# Patient Record
Sex: FEMALE | Race: OTHER | HISPANIC OR LATINO | Employment: FULL TIME | URBAN - METROPOLITAN AREA
[De-identification: names, ages, dates, MRNs, and addresses within clinical notes are randomized per-mention and may not be internally consistent; named-entity substitution may affect disease eponyms.]

---

## 2018-03-30 ENCOUNTER — HOSPITAL ENCOUNTER (EMERGENCY)
Facility: HOSPITAL | Age: 33
Discharge: HOME | End: 2018-03-30
Admitting: EMERGENCY MEDICINE
Payer: COMMERCIAL

## 2018-03-30 ENCOUNTER — APPOINTMENT (EMERGENCY)
Dept: ULTRASOUND IMAGING | Facility: HOSPITAL | Age: 33
End: 2018-03-30
Payer: COMMERCIAL

## 2018-03-30 VITALS
OXYGEN SATURATION: 95 % | HEART RATE: 64 BPM | SYSTOLIC BLOOD PRESSURE: 110 MMHG | TEMPERATURE: 98.6 F | WEIGHT: 162 LBS | RESPIRATION RATE: 18 BRPM | DIASTOLIC BLOOD PRESSURE: 62 MMHG

## 2018-03-30 DIAGNOSIS — R10.2 PELVIC PAIN: ICD-10-CM

## 2018-03-30 DIAGNOSIS — O03.9 FETAL DEMISE DUE TO MISCARRIAGE: Primary | ICD-10-CM

## 2018-03-30 LAB
ABO GROUP BLD: NORMAL
ALBUMIN SERPL BCP-MCNC: 4.1 G/DL (ref 3.5–5)
ALP SERPL-CCNC: 41 U/L (ref 46–116)
ALT SERPL W P-5'-P-CCNC: 15 U/L (ref 12–78)
ANION GAP SERPL CALCULATED.3IONS-SCNC: 8 MMOL/L (ref 4–13)
AST SERPL W P-5'-P-CCNC: 14 U/L (ref 5–45)
B-HCG SERPL-ACNC: ABNORMAL MIU/ML
BACTERIA UR QL AUTO: ABNORMAL /HPF
BASOPHILS # BLD AUTO: 0.03 THOUSANDS/ΜL (ref 0–0.1)
BASOPHILS NFR BLD AUTO: 0 % (ref 0–1)
BILIRUB SERPL-MCNC: 0.28 MG/DL (ref 0.2–1)
BILIRUB UR QL STRIP: NEGATIVE
BUN SERPL-MCNC: 8 MG/DL (ref 5–25)
CALCIUM SERPL-MCNC: 9 MG/DL (ref 8.3–10.1)
CHLORIDE SERPL-SCNC: 101 MMOL/L (ref 100–108)
CLARITY UR: CLEAR
CO2 SERPL-SCNC: 26 MMOL/L (ref 21–32)
COLOR UR: YELLOW
CREAT SERPL-MCNC: 0.73 MG/DL (ref 0.6–1.3)
EOSINOPHIL # BLD AUTO: 0.17 THOUSAND/ΜL (ref 0–0.61)
EOSINOPHIL NFR BLD AUTO: 2 % (ref 0–6)
ERYTHROCYTE [DISTWIDTH] IN BLOOD BY AUTOMATED COUNT: 13.3 % (ref 11.6–15.1)
EXT PREG TEST URINE: POSITIVE
GFR SERPL CREATININE-BSD FRML MDRD: 111 ML/MIN/1.73SQ M
GLUCOSE SERPL-MCNC: 84 MG/DL (ref 65–140)
GLUCOSE UR STRIP-MCNC: NEGATIVE MG/DL
HCT VFR BLD AUTO: 36.2 % (ref 34.8–46.1)
HGB BLD-MCNC: 12.2 G/DL (ref 11.5–15.4)
HGB UR QL STRIP.AUTO: NEGATIVE
KETONES UR STRIP-MCNC: NEGATIVE MG/DL
LEUKOCYTE ESTERASE UR QL STRIP: ABNORMAL
LIPASE SERPL-CCNC: 128 U/L (ref 73–393)
LYMPHOCYTES # BLD AUTO: 1.95 THOUSANDS/ΜL (ref 0.6–4.47)
LYMPHOCYTES NFR BLD AUTO: 23 % (ref 14–44)
MCH RBC QN AUTO: 29 PG (ref 26.8–34.3)
MCHC RBC AUTO-ENTMCNC: 33.7 G/DL (ref 31.4–37.4)
MCV RBC AUTO: 86 FL (ref 82–98)
MONOCYTES # BLD AUTO: 0.83 THOUSAND/ΜL (ref 0.17–1.22)
MONOCYTES NFR BLD AUTO: 10 % (ref 4–12)
NEUTROPHILS # BLD AUTO: 5.39 THOUSANDS/ΜL (ref 1.85–7.62)
NEUTS SEG NFR BLD AUTO: 65 % (ref 43–75)
NITRITE UR QL STRIP: NEGATIVE
NON-SQ EPI CELLS URNS QL MICRO: ABNORMAL /HPF
NRBC BLD AUTO-RTO: 0 /100 WBCS
PH UR STRIP.AUTO: 7.5 [PH] (ref 4.5–8)
PLATELET # BLD AUTO: 218 THOUSANDS/UL (ref 149–390)
PMV BLD AUTO: 12.5 FL (ref 8.9–12.7)
POTASSIUM SERPL-SCNC: 3.9 MMOL/L (ref 3.5–5.3)
PROT SERPL-MCNC: 7.6 G/DL (ref 6.4–8.2)
PROT UR STRIP-MCNC: NEGATIVE MG/DL
RBC # BLD AUTO: 4.21 MILLION/UL (ref 3.81–5.12)
RBC #/AREA URNS AUTO: ABNORMAL /HPF
RH BLD: POSITIVE
SODIUM SERPL-SCNC: 135 MMOL/L (ref 136–145)
SP GR UR STRIP.AUTO: 1.01 (ref 1–1.03)
UROBILINOGEN UR QL STRIP.AUTO: 0.2 E.U./DL
WBC # BLD AUTO: 8.37 THOUSAND/UL (ref 4.31–10.16)
WBC #/AREA URNS AUTO: ABNORMAL /HPF

## 2018-03-30 PROCEDURE — 84702 CHORIONIC GONADOTROPIN TEST: CPT | Performed by: PHYSICIAN ASSISTANT

## 2018-03-30 PROCEDURE — 85025 COMPLETE CBC W/AUTO DIFF WBC: CPT | Performed by: PHYSICIAN ASSISTANT

## 2018-03-30 PROCEDURE — 81025 URINE PREGNANCY TEST: CPT | Performed by: PHYSICIAN ASSISTANT

## 2018-03-30 PROCEDURE — 36415 COLL VENOUS BLD VENIPUNCTURE: CPT | Performed by: PHYSICIAN ASSISTANT

## 2018-03-30 PROCEDURE — 80053 COMPREHEN METABOLIC PANEL: CPT | Performed by: PHYSICIAN ASSISTANT

## 2018-03-30 PROCEDURE — 99284 EMERGENCY DEPT VISIT MOD MDM: CPT

## 2018-03-30 PROCEDURE — 76801 OB US < 14 WKS SINGLE FETUS: CPT

## 2018-03-30 PROCEDURE — 96361 HYDRATE IV INFUSION ADD-ON: CPT

## 2018-03-30 PROCEDURE — 81001 URINALYSIS AUTO W/SCOPE: CPT

## 2018-03-30 PROCEDURE — 86901 BLOOD TYPING SEROLOGIC RH(D): CPT | Performed by: PHYSICIAN ASSISTANT

## 2018-03-30 PROCEDURE — 96360 HYDRATION IV INFUSION INIT: CPT

## 2018-03-30 PROCEDURE — 86900 BLOOD TYPING SEROLOGIC ABO: CPT | Performed by: PHYSICIAN ASSISTANT

## 2018-03-30 PROCEDURE — 83690 ASSAY OF LIPASE: CPT | Performed by: PHYSICIAN ASSISTANT

## 2018-03-30 RX ORDER — IBUPROFEN 600 MG/1
600 TABLET ORAL EVERY 6 HOURS PRN
Qty: 30 TABLET | Refills: 0 | Status: SHIPPED | OUTPATIENT
Start: 2018-03-30

## 2018-03-30 RX ADMIN — SODIUM CHLORIDE 1000 ML: 0.9 INJECTION, SOLUTION INTRAVENOUS at 13:04

## 2018-03-30 NOTE — ED PROVIDER NOTES
History  Chief Complaint   Patient presents with    Vaginal Pain     Pelvic and vaginal pain that started forty minutes prior to ED arrival       32y  o female with no significant PMH presents to the ER for lower abdominal and vaginal pain for 1 day  Patient is [id number]  Patient believes she is about 6 weeks pregnant  Her LMP was 2/2  She was seeing [facility] and had an ultrasound completed  Patient states they did not see a heartbeat and told her that the baby was   Patient states she was sent home from [facility] and encouraged to follow up for another appointment at a later date  Patient comes to the ER for a second opinion  Patient denies taking any medication for pain  She describes her pain as crampy and non-radiating  She rates her pain 10/10 and states it comes and goes  Associated symptoms: nausea and subjective fever  She denies chills, chest pain, dyspnea, vomiting, diarrhea, vaginal bleeding, vaginal discharge, urinary symptoms, weakness or paresthesias  History provided by:  Patient   used: No        None       History reviewed  No pertinent past medical history  History reviewed  No pertinent surgical history  History reviewed  No pertinent family history  I have reviewed and agree with the history as documented  Social History   Substance Use Topics    Smoking status: Current Every Day Smoker    Smokeless tobacco: Never Used    Alcohol use No        Review of Systems   Constitutional: Positive for fever (subjective)  Negative for chills  Eyes: Negative for redness  Respiratory: Negative for shortness of breath  Cardiovascular: Negative for chest pain  Gastrointestinal: Positive for abdominal pain and nausea  Negative for diarrhea and vomiting  Genitourinary: Positive for vaginal pain  Negative for dysuria, frequency, hematuria, urgency, vaginal bleeding and vaginal discharge  Musculoskeletal: Negative for neck stiffness     Skin: Negative for rash  Allergic/Immunologic: Negative for food allergies  Neurological: Negative for weakness and numbness  Physical Exam  ED Triage Vitals   Temperature Pulse Respirations Blood Pressure SpO2   03/30/18 1201 03/30/18 1201 03/30/18 1201 03/30/18 1201 03/30/18 1201   98 6 °F (37 °C) 77 18 134/72 100 %      Temp Source Heart Rate Source Patient Position - Orthostatic VS BP Location FiO2 (%)   03/30/18 1201 03/30/18 1510 03/30/18 1510 03/30/18 1510 --   Oral Monitor Lying Right arm       Pain Score       03/30/18 1201       Worst Possible Pain           Orthostatic Vital Signs  Vitals:    03/30/18 1201 03/30/18 1510   BP: 134/72 110/62   Pulse: 77 64   Patient Position - Orthostatic VS:  Lying       Physical Exam   Constitutional:  Non-toxic appearance  No distress  HENT:   Head: Normocephalic and atraumatic  Right Ear: Tympanic membrane, external ear and ear canal normal  No drainage, swelling or tenderness  No foreign bodies  Tympanic membrane is not erythematous  No hemotympanum  Left Ear: Tympanic membrane, external ear and ear canal normal  No drainage, swelling or tenderness  No foreign bodies  Tympanic membrane is not erythematous  No hemotympanum  Nose: Nose normal    Mouth/Throat: Uvula is midline, oropharynx is clear and moist and mucous membranes are normal  No uvula swelling  No posterior oropharyngeal edema, posterior oropharyngeal erythema or tonsillar abscesses  No tonsillar exudate  Neck: Normal range of motion and phonation normal  Neck supple  No tracheal deviation present  Cardiovascular: Normal rate, regular rhythm, S1 normal, S2 normal and normal heart sounds  Exam reveals no gallop and no friction rub  No murmur heard  Pulmonary/Chest: Effort normal and breath sounds normal  No respiratory distress  She has no decreased breath sounds  She has no wheezes  She has no rhonchi  She has no rales  She exhibits no tenderness  Abdominal: Soft   Bowel sounds are normal  She exhibits no distension  There is tenderness (significant amount of pain in right pelvic area ) in the right lower quadrant, suprapubic area and left lower quadrant  There is no rebound, no guarding and no CVA tenderness  Neurological: She is alert  GCS eye subscore is 4  GCS verbal subscore is 5  GCS motor subscore is 6  Skin: Skin is warm and dry  No rash noted  Psychiatric: She has a normal mood and affect  Nursing note and vitals reviewed        ED Medications  Medications   sodium chloride 0 9 % bolus 1,000 mL (1,000 mL Intravenous New Bag 3/30/18 1304)       Diagnostic Studies  Results Reviewed     Procedure Component Value Units Date/Time    Lipase [[id number]]  (Normal) Collected:  03/30/18 1300    Lab Status:  Final result Specimen:  Blood from Arm, Left Updated:  03/30/18 1350     Lipase 128 u/L     Pregnancy, hCG, quantitative [[id number]]  (Abnormal) Collected:  03/30/18 1300    Lab Status:  Final result Specimen:  Blood from Arm, Left Updated:  03/30/18 1350     HCG, Quant 58,081 0 (H) mIU/mL     Narrative:          Expected Ranges:     Approximate               Approximate HCG  Gestation age          Concentration ( mIU/mL)  _____________          ______________________   [facility]                      HCG values  0 2-1                       5-50  1-2                           2-3                         100-5000  3-4                         500-58645  4-5                         1000-81199  5-6                         [id number]  6-8                         [id number]  8-12                        [id number]    Comprehensive metabolic panel [[id number]]  (Abnormal) Collected:  03/30/18 1300    Lab Status:  Final result Specimen:  Blood from Arm, Left Updated:  03/30/18 1327     Sodium 135 (L) mmol/L      Potassium 3 9 mmol/L      Chloride 101 mmol/L      CO2 26 mmol/L      Anion Gap 8 mmol/L      BUN 8 mg/dL      Creatinine 0 73 mg/dL      Glucose 84 mg/dL      Calcium 9 0 mg/dL      AST 14 U/L ALT 15 U/L      Alkaline Phosphatase 41 (L) U/L      Total Protein 7 6 g/dL      Albumin 4 1 g/dL      Total Bilirubin 0 28 mg/dL      eGFR 111 ml/min/1 73sq m     Narrative:         National Kidney Disease Education Program recommendations are as follows:  GFR calculation is accurate only with a steady state creatinine  Chronic Kidney disease less than 60 ml/min/1 73 sq  meters  Kidney failure less than 15 ml/min/1 73 sq  meters      CBC and differential [[id number]]  (Normal) Collected:  03/30/18 1300    Lab Status:  Final result Specimen:  Blood from Arm, Left Updated:  03/30/18 1311     WBC 8 37 Thousand/uL      RBC 4 21 Million/uL      Hemoglobin 12 2 g/dL      Hematocrit 36 2 %      MCV 86 fL      MCH 29 0 pg      MCHC 33 7 g/dL      RDW 13 3 %      MPV 12 5 fL      Platelets 094 Thousands/uL      nRBC 0 /100 WBCs      Neutrophils Relative 65 %      Lymphocytes Relative 23 %      Monocytes Relative 10 %      Eosinophils Relative 2 %      Basophils Relative 0 %      Neutrophils Absolute 5 39 Thousands/µL      Lymphocytes Absolute 1 95 Thousands/µL      Monocytes Absolute 0 83 Thousand/µL      Eosinophils Absolute 0 17 Thousand/µL      Basophils Absolute 0 03 Thousands/µL     Urine Microscopic [[id number]]  (Abnormal) Collected:  03/30/18 1219    Lab Status:  Final result Specimen:  Urine Updated:  03/30/18 1251     RBC, UA None Seen /hpf      WBC, UA 2-4 (A) /hpf      Epithelial Cells Occasional /hpf      Bacteria, UA None Seen /hpf     POCT pregnancy, urine [[id number]]  (Normal) Resulted:  03/30/18 1248    Lab Status:  Final result Specimen:  Urine Updated:  03/30/18 1248     EXT PREG TEST UR (Ref: Negative) positive    ED Urine Macroscopic [[id number]]  (Abnormal) Collected:  03/30/18 1219    Lab Status:  Final result Specimen:  Urine Updated:  03/30/18 1220     Color, UA Yellow     Clarity, UA Clear     pH, UA 7 5     Leukocytes, UA Trace (A)     Nitrite, UA Negative     Protein, UA Negative mg/dl      Glucose, UA Negative mg/dl      Ketones, UA Negative mg/dl      Urobilinogen, UA 0 2 E U /dl      Bilirubin, UA Negative     Blood, UA Negative     Specific Gravity, UA 1 015    Narrative:       CLINITEK RESULT                 US OB < 14 weeks with transvaginal   Final Result by Sharonda Bonilla MD (03/30 1443)   Intrauterine gestational sac, yolk sac and fetal pole identified without cardiac activity consistent with fetal demise at 6 weeks and 2 days  I personally discussed this study with Lopez Nemesio on 3/30/2018 at 2:41 PM                Workstation performed: [id number]                    Procedures  Procedures       Phone Contacts  ED Phone Contact    ED Course  ED Course as of Mar 30 1541   Fri Mar 30, 2018   [facility], ultrasound technician, called and informed me patient has a right ovarian cyst  This is most likely the cause of patient's pain  Will hold off on appendix ultrasound  [address] with Dr Vania Brown from Radiology  He reports fetal demise on ultrasound  [address] with patient with Jarrod Jiménez RN in room for translation as needed  Informed patient of fetal demise on ultrasound  Informed patient I spoke with her OBGYN, Dr Raquel Lawrence, and I asked patient how we could help her in the ER today since her OBGYN had already informed her of possible demise and made arrangements for her to go in for a surgical procedure for the demise today at 12:50, which she missed the appointment  Patient states she came in for her abdominal cramping  Informed patient that cramping and bleeding is normal when having a miscarriage  Informed patient that she will need to follow up as an outpatient to ensure complete miscarriage  Informed patient that miscarriages can be managed at home  Patient is capable of completing miscarriage at home but if miscarriage is not completed, it may need surgical intervention and this is why she needs to follow up with OBGYN  Offered pelvic exam but patient declined   Will consult OB for further recommendations  26 Spoke with Christine from OB  She does not recommend anything further  She recommends that patient follow up with clinic on Monday Morning  If she is soaking greater than 1 pad per hour or is feeling dizzy or lightheaded she needs to return  If signs of infection: fever, chills or night sweats she also needs to return  Patient can take Motrin for pain  MDM  Number of Diagnoses or Management Options  Fetal demise due to miscarriage: new and requires workup  Pelvic pain: new and requires workup  Diagnosis management comments: DDX consists of but not limited to: early pregnancy, miscarriage, appendicitis, UTI    Will check pregnancy, CBC, CMP, lipase, HCG, urine, ultrasound and appendix ultrasound  Spoke with patient's OBGYN, Dr Rodo Mas  She informed me that patient was seen on 3/23 and there was an intrauterine gestation seen without a heartbeat  Patient was then seen on 3/29 and had another ultrasound completed which showed no growth  A sac and yolk sac was seen without a heartbeat  Patient was told by Dr Rodo Mas that they could either manage this with medication or surgery  Patient did not want to make a decision yesterday so she went home  She called this morning and was requesting surgery for the detal demise  OB office informed her she could have the surgical procedure performed on Tuesday  Patient then stated she would like a second opinion and began calling around to other [facility] OBGYNs and was told similar advice  Patient's OB office scheduled her surgical procedure for today at 12:50 but patient did not go but instead came to the ER for a second opinion and evaluation for lower abdominal cramping  [address], ultrasound technician, called and informed me patient has a right ovarian cyst  This is most likely the cause of patient's pain  Will hold off on appendix ultrasound  [address] with Dr Roxana Bonner from Radiology   He reports fetal demise on ultrasound  [address] with patient with Radha Berry RN in room for translation as needed  Informed patient of fetal demise on ultrasound  Informed patient I spoke with her OBGYN, Dr David Lee, and I asked patient how we could help her in the ER today since her OBGYN had already informed her of possible demise and made arrangements for her to go in for a surgical procedure for the demise today at 12:50, which she missed the appointment  Patient states she came in for her abdominal cramping  Informed patient that cramping and bleeding is normal when having a miscarriage  Informed patient that she will need to follow up as an outpatient to ensure complete miscarriage  Informed patient that miscarriages can be managed at home  Patient is capable of completing miscarriage at home but if miscarriage is not completed, it may need surgical intervention and this is why she needs to follow up with OBGYN  Offered pelvic exam but patient declined  Will consult OB for further recommendations  26 Spoke with Christine from OB  She does not recommend anything further  She recommends that patient follow up with clinic on Monday Morning  If she is soaking greater than 1 pad per hour or is feeling dizzy or lightheaded she needs to return  If signs of infection: fever, chills or night sweats she also needs to return  Patient can take Motrin for pain  Informed patient of these recommendations  Patient agreeable  At discharge, I instructed the patient to:  -follow up with pcp  -take Motrin for pain  -follow up with the recommended women's clinic on Monday  -rest and drink plenty of fluids  -if soaking through more than 1 pad per hour or dizziness or lightheaded return to the ER  -if signs of infection such as fever or chills, return to the ER  -return to the ER if symptoms worsened or new symptoms arose  Patient agreed to this plan and was stable at time of discharge           Amount and/or Complexity of Data Reviewed  Clinical lab tests: ordered and reviewed  Tests in the radiology section of CPT®: ordered and reviewed  Obtain history from someone other than the patient: yes (Spoke with patient's OB, Dr Aleksandr Martinez)  Review and summarize past medical records: yes  Discuss the patient with other providers: yes (Spoke with Christine from [facility])    Patient Progress  Patient progress: stable    CritCare Time    Disposition  Final diagnoses:   Fetal demise due to miscarriage   Pelvic pain     Time reflects when diagnosis was documented in both MDM as applicable and the Disposition within this note     Time User Action Codes Description Comment    3/30/2018  3:29 PM Sawyer SULTANA Add [O03 9] Fetal demise due to miscarriage     3/30/2018  3:29 PM Emmanuel Alejandra Add [R10 2] Pelvic pain       ED Disposition     ED Disposition Condition Comment    Discharge  [place] discharge to home/self care  Condition at discharge: Stable        Follow-up Information     Follow up With Specialties Details Why [facility] Schedule an appointment as soon as possible for a visit in 1 day miscarriage [place]  [id number] [phone]        Patient's Medications   Discharge Prescriptions    IBUPROFEN (MOTRIN) 600 MG TABLET    Take 1 tablet (600 mg total) by mouth every 6 (six) hours as needed for mild pain or moderate pain       Start Date: 3/30/2018 End Date: --       Order Dose: 600 mg       Quantity: 30 tablet    Refills: 0     No discharge procedures on file      ED Provider  Electronically Signed by           Fabiola Johnson PA-C  03/30/18 4885

## 2018-03-30 NOTE — ED NOTES
At end of triage, pt report to have subjective fever and reports to be currently 2 months pregnant        Paco Man RN  03/30/18 0813

## 2018-03-30 NOTE — ED NOTES
Pt returned from [address] [place] [address] Rd[address]          Lesley Posada, RN  03/30/18 8734

## 2018-03-30 NOTE — ED NOTES
Pt states yesterday she had an US done at [facility] and was told the fetus had no heartbeat and no growth  Pt then was dishcarged home and states was not given now follow up         Lois Powell RN  03/30/18 2360

## 2018-03-30 NOTE — DISCHARGE INSTRUCTIONS
Aborto natural   LO QUE NECESITA SABER:   Un aborto natural es la pérdida del feto antes de la 20ª semana de Carleen  Un aborto natural también se conoce josefina aborto espóntaneo o jovany pérdida temprana del Carleen  INSTRUCCIONES SOBRE [facility]:   Regrese a la kyle de emergencias si:   · Tiene secreción o pus malolientes saliendo de louis vagina  · Usted tiene sangrado vaginal abundante y en el transcurso de jovany hora empapa más de 1 toalla [place]  · Usted tiene dolor abdominal intenso  · Usted siente que louis corazón está latiendo más rápido de lo normal      · Usted se siente sumamente mareado o débil  Pregúntele a louis Roseann Paddy vitaminas y minerales son adecuados para usted  · Usted tiene jovany temperatura superior a los 100 4°F o escalofrios  · Usted tiene jovany enorme tristeza, larry o siente que no puede lidiar con lo que le ha pasado  · Usted tiene preguntas o inquietudes acerca de louis condición o cuidado  Cuidados personales:   · No se ponga nada dentro de louis vagina por 2 semanas o según las indicaciones  No use tampones, duchas vaginales ni lleve acabo el acto sexual  Estas acciones pueden causar jovany infección y dolor  · Use toallas sanitarias según las necesidades  Es posible que usted tenga sangrado o manchado leve por 2 semanas  · No tome harrison de brandy ni vaya a nadar por 2 semanas o según las indicaciones  Estas acciones pueden aumentar louis riesgo de contraer jovany infección  Sólo tome duchas  · Descanse tanto josefina sea necesario  Empiece a hacer un poco más día a día  Regrese a yanet actividades diarias josefina se le haya indicado  · Consulte con louis médico sobre los métodos anticonceptivos  En gaby que le interese prevenir otro [facility], pregunte a louis médico cuál método [employer] recomienda  · Unirse a un destiny de apoyo o la terapia emocional puede ser beneficioso para afrontar yanet sentimientos    Un aborto natural puede ser muy dificil para [place], [place] ricardo y otros miembros de antunez darion  Después de [facility] pérdida del embarazo se pueden sentir muchos sentimientos  Usted podría sentir jovany larry intensa u otros sentimientos  Podría ser beneficioso hablar con [place], con un familiar o con un consejero acerca de yanet sentimientos  Usted también podría estar preocupada por la posibilidad de sufrir otro aborto natural  Consulte con antunez médico acerca de yanet preocupaciones  El médico podría ayudarla a disminuir el riesgo de otro aborto  También le podría ayudar a encontrar formas para sobrellevar la larry y aflicción que siente  Para más información:   · [employer]  [address] [place] [address]  Phone: [phone]  Phone: [phone]  Web Address: http://[employer]/  [employer]  · [facility]  [address]  Web Address: [email or web address]  com  Acuda a yanet consultas de control con antunez médico según le indicaron  Usted podría necesitar acudir con antunez médico para que le ordene exámenes de salazar o jovany ecografía  Anote yanet preguntas para que se acuerde de hacerlas nuvia yanet visitas  © 2017 [facility] Information is for End User's use only and may not be sold, redistributed or otherwise used for commercial purposes  All illustrations and images included in CareNotes® are the copyrighted property of A D A M , Inc  or Nadir Kaba  Esta información es sólo para uso en educación  Antunez intención no es darle un consejo médico sobre enfermedades o tratamientos  Colsulte con antunez Benuel Velázquez farmacéutico antes de seguir cualquier régimen médico para saber si es seguro y efectivo para usted  Dolor en la pelvis   LO QUE NECESITA SABER:   El dolor pélvico puede ser causado por ciertas condiciones, josefina el síndrome del intestino irritable, apendicitis o estreñimiento   Jovany infección del tracto urinario, inflamación de la próstata, calambres menstruales o piedras en el riñón también pueden provocar dolor pélvico  Usted puede tener dolor en vipul o ambos lados de la pelvis  El dolor pélvico puede desarrollase si usted tiene trauma en louis pelvis o si está de pie por [employer]  Robert Calico [facility]:   Medicamentos:  Es posible que usted necesite alguno de los siguientes:  · AINEs (Analgésicos antiinflamatorios no esteroides) josefina el ibuprofeno, ayudan a disminuir la inflamación, el dolor y la fiebre  Vanda medicamento esta disponible con o sin jovany receta médica  Los AINEs pueden causar sangrado estomacal o problemas renales en ciertas personas  Si usted kar un medicamento anticoagulante, siempre pregúntele a louis médico si los MADI son seguros para usted  Siempre mouna la etiqueta de vanda medicamento y [place] instrucciones  · Un medicamento con receta para el dolor  podrían ser Chen Luciano  Pregunte cómo herminia estos medicamentos de jovany forma shultz  · Los medicamentos anticonceptivos  podrían ayudar a disminuir el dolor en las mujeres  · [place] yanet medicamentos josefina se le haya indicado  Consulte con louis médico si usted bere que louis medicamento no le está ayudando o si presenta efectos secundarios  Infórmele si es alérgico a cualquier medicamento  Mantenga jovany lista actualizada de los [place], las vitaminas y los productos herbales que kar  Incluya los siguientes datos de los medicamentos: cantidad, frecuencia y motivo de administración  Traiga con usted la lista o los envases de la píldoras a yanet citas de seguimiento  Lleve la lista de los medicamentos con usted en gaby de jovany emergencia  Llame al 911 en gaby de presentar lo siguiente:   · Usted siente dolor willy en el pecho y dificultad repentina para respirar  Pregúntele a louis [place] vitaminas y minerales son adecuados para usted  · Usted tiene fiebre  · Usted tiene náuseas, vómitos o diarrea      · Louis dolor no desaparece, o empeora, aún después del tratamiento  · Usted tiene entumecimiento en yanet piernas o dedos de los pies  · Usted tiene sangrando vaginal inusual o abundante  · Usted tiene preguntas o inquietudes acerca de antunez condición o cuidado  Controle antunez dolor pélvico:   · Mantenga un registro del dolor  Anote cuando tiene dolor y cuál es la intensidad  Incluya cualquier otro síntoma que sienta además del dolor  Un registro sirve para ayudar a mantener un seguimiento de los ciclos del dolor  Tho Citrin registro con usted a yanet citas de seguimiento  También podría ayudarle a antunez médico a encontrar la causa del dolor  · Aprenda métodos de relajación  La respiración profunda, la meditación y las técnicas de relajación pueden ayudarlo a disminuir el dolor  Es posible que sienta más dolor si está tenso  · Trate o evite el estreñimiento  [place] líquidos josefina se le haya indicado  Es probable que usted tenga que herminia más liquido que de [place]  Pregúntele a antunez médico cuál es la cantidad de líquido que necesita ingerir a diario  Coma alimentos altos en fibras  Alimentos altos en [address] frutas, vegetales, panes y cereales integrales, y frijoles  Es posible que usted necesite cambiar los alimentos que consume si padece del síndrome del intestino irritable  Acuda a yanet consultas de control con antunez médico según le indicaron  Usted podría necesitar fisioterapia  Es posible que necesite gabi a un especialista ortopédico  Anote yanet preguntas para que se acuerde de hacerlas nuvia yanet visitas  © 2017 [address] Information is for End User's use only and may not be sold, redistributed or otherwise used for commercial purposes  All illustrations and images included in CareNotes® are the copyrighted property of A D A M , Inc  or Nadir Kaba  Esta información es sólo para uso en educación  Antunez intención no es darle un consejo médico sobre enfermedades o tratamientos   Colsulte con antunez Ladean Artist farmacéutico antes de seguir cualquier régimen médico para saber si es seguro y efectivo para usted  DISCHARGE INSTRUCTIONS:    FOLLOW UP WITH YOUR PRIMARY CARE PROVIDER OR THE [address] [place] [address]  MAKE AN APPOINTMENT TO BE SEEN  TAKE MOTRIN FOR PAIN  FOLLOW UP WITH THE RECOMMENDED [address]  CALL TO MAKE AN APPOINTMENT FOR Monday  REST AND DRINK PLENTY OF FLUIDS  IF SOAKING GREATER THAN 1 PAD PER HOUR OR FEELING DIZZY OR LIGHTHEADED RETURN TO THE ER  IF FEVER, CHILLS OR NIGHT SWEATS RETURN TO THE ER  IF SYMPTOMS WORSEN OR NEW SYMPTOMS ARISE, RETURN TO THE ER TO BE SEEN

## 2018-04-02 ENCOUNTER — OFFICE VISIT (OUTPATIENT)
Dept: OBGYN CLINIC | Facility: HOSPITAL | Age: 33
End: 2018-04-02
Payer: COMMERCIAL

## 2018-04-02 VITALS — WEIGHT: 165 LBS | DIASTOLIC BLOOD PRESSURE: 80 MMHG | SYSTOLIC BLOOD PRESSURE: 120 MMHG

## 2018-04-02 DIAGNOSIS — O02.1 MISSED ABORTION: Primary | ICD-10-CM

## 2018-04-02 PROCEDURE — 99202 OFFICE O/P NEW SF 15 MIN: CPT | Performed by: OBSTETRICS & GYNECOLOGY

## 2018-04-02 RX ORDER — MISOPROSTOL 200 UG/1
800 TABLET ORAL ONCE
Status: DISCONTINUED | OUTPATIENT
Start: 2018-04-02 | End: 2018-04-03

## 2018-04-02 RX ORDER — KETOROLAC TROMETHAMINE 10 MG/1
10 TABLET, FILM COATED ORAL EVERY 6 HOURS PRN
Qty: 20 TABLET | Refills: 0 | Status: SHIPPED | OUTPATIENT
Start: 2018-04-02

## 2018-04-02 RX ORDER — MISOPROSTOL 200 UG/1
TABLET ORAL
Qty: 4 TABLET | Refills: 0 | Status: SHIPPED | OUTPATIENT
Start: 2018-04-02

## 2018-04-02 NOTE — PROGRESS NOTES
Assessment/Plan      Missed   -     misoprostol (CYTOTEC) tablet 800 mcg; Insert 4 tablets (800 mcg total) into the vagina once   -     ketorolac (TORADOL) 10 mg tablet; Take 1 tablet (10 mg total) by mouth every 6 (six) hours as needed for moderate pain  -follow-up in 1 week for confirmation successful completion  discussed with patient expectant management versus medical management versus surgical management  Patient would like medical management at this time  Patient instructed to take 800 mcg of Cytotec vaginally when she gets home  Patient had concern of having it placed in the clinic due to the fact that she was driving home  Patient was also given a script for Toradol however she left it in the clinic  Discussed with patient that she may experience heavy bleeding and passage of tissue  Patient given precautions for when to call or come to the ER  [facility] is a 29 y o  female  Marita Doherty reports Being diagnosed with a missed   She was seen both at [facility] and in the ER  both at [facility] and in the ER she was diagnosed with in into uterine gestational sac, yolk sac, fetal pole without cardiac activity consistent with fetal demise of 6 weeks and 2 days  She is not in acute distress  She was initially scheduled for surgery with [facility] however did not go to that appointment and went to the ER instead due to cramping  Patient states that she has not had any bleeding or passed any tissue  She would like management  Cycle length: regular   Pregnancy testing: quant HCG level 58,081 on 3/30/18  Pregnancy imaging: transvaginal ultrasound done on 3/30  Result IUP at 6 weeks and 2 days with no cardiac activity  Blood type: A positive  Other lab results: hematocrit 36 2  Review of Systems  Pertinent items are noted in HPI       Objective      /80   Wt 74 8 kg (165 lb)   LMP 2018     General: alert and oriented, in no acute distress   Abdomen: soft, non-tender, without masses or organomegaly   Vulva: normal   Vagina: normal mucosa     Imaging  Limited office ultrasound:  Transvaginal ultrasound shows an intrauterine gestational sac and a fetus crown-rump length of 6 weeks 2 days with no cardiac activity  Uterus is anteverted  No adnexal masses were seen

## 2018-04-02 NOTE — PATIENT INSTRUCTIONS
Miscarriage   WHAT YOU NEED TO KNOW:   A miscarriage is the loss of a fetus within the first 20 weeks of pregnancy  A miscarriage may also be called a spontaneous  or an early pregnancy loss  DISCHARGE INSTRUCTIONS:   Seek care immediately if:   · You have foul-smelling drainage or pus coming from your vagina  · You have heavy vaginal bleeding and soak 1 pad or more in an hour  · You have severe abdominal pain  · You feel like your heart is beating faster than normal      · You feel extremely weak or dizzy  Contact your healthcare provider if:   · You have a fever greater than 100 4°F or chills  · You have extreme sadness, grief, or feel unable to cope with what has happened  · You have questions or concerns about your condition or care  Self-care:   · Do not put anything in your vagina for 2 weeks or as directed  Do not use tampons, douche, or have sex  These actions can cause infection and pain  · Use sanitary pads as needed  You may have light bleeding or spotting for 2 weeks  · Do not take a bath or go swimming for 2 weeks or as directed  These actions may increase your risk for an infection  Take showers only  · Rest as needed  Slowly start to do more each day  Return to your daily activities as directed  · Talk to your healthcare provider about birth control  If you would like to prevent another pregnancy, ask your healthcare provider which type of birth control is best for you  · Join a support group or therapy to help you cope  A miscarriage may be very difficult for you, your partner, and other members of your family  There is no right way to feel after a miscarriage  You may feel overwhelming grief or other emotions  It may be helpful to talk to a friend, family member, or counselor about your feelings  You may worry that you could have another miscarriage  Talk to your healthcare provider about your concerns   He may be able to help you reduce the risk for another miscarriage  He may also help you find ways to cope with grief  For more information:   · [employer]  [address] [place] [address] , [address] [place] [address]  Phone: [phone]  Phone: [phone]  Web Address: http://[employer]/  [employer]  · [facility]  [address] [facility] , [address] [place] [address]  Web Address: Property Place  Follow up with your healthcare provider as directed: You may need to see your healthcare provider for blood tests or an ultrasound  Write down your questions so you remember to ask them during your visits  © 2017 [facility] Information is for End User's use only and may not be sold, redistributed or otherwise used for commercial purposes  All illustrations and images included in CareNotes® are the copyrighted property of A D A M , Inc  or Nadir Kaba  The above information is an  only  It is not intended as medical advice for individual conditions or treatments  Talk to your doctor, nurse or pharmacist before following any medical regimen to see if it is safe and effective for you

## 2018-04-17 ENCOUNTER — OFFICE VISIT (OUTPATIENT)
Dept: OBGYN CLINIC | Facility: HOSPITAL | Age: 33
End: 2018-04-17
Payer: COMMERCIAL

## 2018-04-17 VITALS
WEIGHT: 168 LBS | DIASTOLIC BLOOD PRESSURE: 88 MMHG | TEMPERATURE: 98.3 F | SYSTOLIC BLOOD PRESSURE: 142 MMHG | BODY MASS INDEX: 26.37 KG/M2 | HEART RATE: 91 BPM | HEIGHT: 67 IN

## 2018-04-17 DIAGNOSIS — O02.1 MISSED ABORTION: ICD-10-CM

## 2018-04-17 PROCEDURE — 99214 OFFICE O/P EST MOD 30 MIN: CPT | Performed by: OBSTETRICS & GYNECOLOGY

## 2018-04-17 RX ORDER — KETOROLAC TROMETHAMINE 10 MG/1
10 TABLET, FILM COATED ORAL EVERY 6 HOURS PRN
Qty: 20 TABLET | Refills: 0 | Status: CANCELLED | OUTPATIENT
Start: 2018-04-17

## 2018-04-17 NOTE — LETTER
April 17, 2018     Patient: Kirsten Westfall   YOB: 1985   Date of Visit: 4/17/2018       To Whom it May Concern:    Kirsten Westfall is under my professional care  She was seen in my office on 4/2/2018 and  4/17/2018  If you have any questions or concerns, please don't hesitate to call      Sincerely,          Henny Madrigal MD

## 2018-04-17 NOTE — PROGRESS NOTES
Gyn follow up visit  S/p Medical management for missed       Subjective:  28 yo  s/p diagnosis of missed  and medical management with Cytotec on 2018  Patient states that she placed 800 mcg of cytotec that was given to her per vagina on the night of 2018 and had some cramping  She says that she the pregnancy in entirety on 2018 followed by moderate vaginal bleeding  Since then she has had minimal vaginal bleeding and sharp vaginal pains  She states that this sharp vaginal pain happens a couple of times a day and is very painful and stops her in her tracks  It does not radiate anywheer and nothing makes it worse  She has tried motrin once which has not helped the pain  She also describes a foul odor but denies any discharge just bleeding noted  She denies any fever, chills, nausea, vomiting, abdominal pain, constipation or diarrhea  Patient states that this was an unintended however desired pregnancy and does desire future fertility  Patient is currently sexually active on no contraception and has never used contraception for the past 6 years without getting pregnant  She desires to get pregnant since possible  Discussed with patient to wait 1 menstrual cycle and she can start trying again  Discussed the rate of spontaneous/ missed AB approx 20 % and that most in early stage are due to genetic abnormality so nothing she could do that could prevent this from happening  Recommended starting a prenatal vitamin  Recommended cessation of alcohol and smoking cigarettes patient desires fertility  Patient has not had a an annual gyn examination 6 years  Objective:  /88   Pulse 91   Temp 98 3 °F (36 8 °C)   Ht 5' 7" (1 702 m)   Wt 76 2 kg (168 lb)   LMP 2018 (Exact Date)   Breastfeeding?  Unknown   BMI 26 31 kg/m²   General: Appears calm and in no acute distress  Abdomen:  Soft, nontender, no palpable masses, no rebound, no guarding  SSE:  Cervix appears 1 cm dilated, minimal blood in vaginal vault, no discharge, no odor noted  SVE:  No cervical motion tenderness, uterus is small and midposition and mobile, mild right adnexal tenderness, no palpable adnexal masses  Wet mount: Negative for clue cells or trichomonads  KOH: Negative for hyphae  Ph: 5 5- however blood in vaginal vault    Transvaginal ultrasound completed with Dr Rudolph Rae:  Uterus anteverted  No products of conception visualized in uterus  Endometrial lining measuring 1 1 cm  Small right ovarian simple cyst measuring 1x 0 9 x1 cm    Assessment and plan:  26-year-old G1 P 0010 presenting following medical management of missed   On today's examination a appears that she has products of conception that was confirmed on transvaginal ultrasound  Unsure as to the etiology of sharp vaginal pain patient is experiencing but there does not appear to be any infectious process on wet mount/ KOH  - Results and findings discussed and reassurance given  - Recommend Motrin 600mg PO Q6 hr PRN for vaginal pain  - Start prenatal vitamin  - Return in 3 weeks for annual exam     Discussed and patient seen with Dr Dinah Holcomb MD

## 2022-10-28 ENCOUNTER — APPOINTMENT (OUTPATIENT)
Dept: LAB | Facility: HOSPITAL | Age: 37
End: 2022-10-28
Payer: MEDICARE

## 2022-10-28 DIAGNOSIS — O09.90 HIGH RISK PREGNANCY, ANTEPARTUM: ICD-10-CM

## 2022-10-28 LAB
ERYTHROCYTE [DISTWIDTH] IN BLOOD BY AUTOMATED COUNT: 14.1 % (ref 11.6–15.1)
GLUCOSE 1H P 50 G GLC PO SERPL-MCNC: 194 MG/DL (ref 40–134)
HCT VFR BLD AUTO: 32.6 % (ref 34.8–46.1)
HGB BLD-MCNC: 10.4 G/DL (ref 11.5–15.4)
MCH RBC QN AUTO: 26.7 PG (ref 26.8–34.3)
MCHC RBC AUTO-ENTMCNC: 31.9 G/DL (ref 31.4–37.4)
MCV RBC AUTO: 84 FL (ref 82–98)
PLATELET # BLD AUTO: 214 THOUSANDS/UL (ref 149–390)
PMV BLD AUTO: 12.7 FL (ref 8.9–12.7)
RBC # BLD AUTO: 3.89 MILLION/UL (ref 3.81–5.12)
WBC # BLD AUTO: 11.03 THOUSAND/UL (ref 4.31–10.16)

## 2022-10-28 PROCEDURE — 36415 COLL VENOUS BLD VENIPUNCTURE: CPT

## 2022-10-28 PROCEDURE — 82950 GLUCOSE TEST: CPT

## 2022-10-28 PROCEDURE — 86592 SYPHILIS TEST NON-TREP QUAL: CPT

## 2022-10-28 PROCEDURE — 85027 COMPLETE CBC AUTOMATED: CPT

## 2022-10-29 LAB — RPR SER QL: NORMAL
